# Patient Record
Sex: FEMALE | Race: WHITE | NOT HISPANIC OR LATINO
[De-identification: names, ages, dates, MRNs, and addresses within clinical notes are randomized per-mention and may not be internally consistent; named-entity substitution may affect disease eponyms.]

---

## 2017-09-07 PROBLEM — Z00.00 ENCOUNTER FOR PREVENTIVE HEALTH EXAMINATION: Status: ACTIVE | Noted: 2017-09-07

## 2017-09-08 ENCOUNTER — APPOINTMENT (OUTPATIENT)
Dept: GYNECOLOGIC ONCOLOGY | Facility: CLINIC | Age: 67
End: 2017-09-08
Payer: MEDICARE

## 2017-09-08 VITALS
SYSTOLIC BLOOD PRESSURE: 140 MMHG | DIASTOLIC BLOOD PRESSURE: 80 MMHG | BODY MASS INDEX: 51.91 KG/M2 | WEIGHT: 293 LBS | HEIGHT: 63 IN

## 2017-09-08 DIAGNOSIS — K58.9 IRRITABLE BOWEL SYNDROME W/OUT DIARRHEA: ICD-10-CM

## 2017-09-08 DIAGNOSIS — E66.9 OBESITY, UNSPECIFIED: ICD-10-CM

## 2017-09-08 DIAGNOSIS — Z78.9 OTHER SPECIFIED HEALTH STATUS: ICD-10-CM

## 2017-09-08 DIAGNOSIS — N92.6 IRREGULAR MENSTRUATION, UNSPECIFIED: ICD-10-CM

## 2017-09-08 PROCEDURE — 99205 OFFICE O/P NEW HI 60 MIN: CPT

## 2017-09-08 RX ORDER — DICYCLOMINE HYDROCHLORIDE 10 MG/1
CAPSULE ORAL
Refills: 0 | Status: ACTIVE | COMMUNITY

## 2017-09-12 LAB — CORE LAB BIOPSY: NORMAL

## 2017-09-26 ENCOUNTER — OTHER (OUTPATIENT)
Age: 67
End: 2017-09-26

## 2017-10-11 ENCOUNTER — OUTPATIENT (OUTPATIENT)
Dept: OUTPATIENT SERVICES | Facility: HOSPITAL | Age: 67
LOS: 1 days | End: 2017-10-11
Payer: COMMERCIAL

## 2017-10-11 VITALS
HEART RATE: 66 BPM | TEMPERATURE: 97 F | SYSTOLIC BLOOD PRESSURE: 145 MMHG | HEIGHT: 63 IN | OXYGEN SATURATION: 98 % | DIASTOLIC BLOOD PRESSURE: 88 MMHG | RESPIRATION RATE: 16 BRPM | WEIGHT: 293 LBS

## 2017-10-11 DIAGNOSIS — E66.9 OBESITY, UNSPECIFIED: ICD-10-CM

## 2017-10-11 DIAGNOSIS — N95.9 UNSPECIFIED MENOPAUSAL AND PERIMENOPAUSAL DISORDER: ICD-10-CM

## 2017-10-11 DIAGNOSIS — E78.5 HYPERLIPIDEMIA, UNSPECIFIED: ICD-10-CM

## 2017-10-11 DIAGNOSIS — N95.0 POSTMENOPAUSAL BLEEDING: ICD-10-CM

## 2017-10-11 DIAGNOSIS — K58.9 IRRITABLE BOWEL SYNDROME WITHOUT DIARRHEA: ICD-10-CM

## 2017-10-11 DIAGNOSIS — R93.8 ABNORMAL FINDINGS ON DIAGNOSTIC IMAGING OF OTHER SPECIFIED BODY STRUCTURES: ICD-10-CM

## 2017-10-11 DIAGNOSIS — Z96.659 PRESENCE OF UNSPECIFIED ARTIFICIAL KNEE JOINT: Chronic | ICD-10-CM

## 2017-10-11 DIAGNOSIS — Z01.818 ENCOUNTER FOR OTHER PREPROCEDURAL EXAMINATION: ICD-10-CM

## 2017-10-11 DIAGNOSIS — Z90.49 ACQUIRED ABSENCE OF OTHER SPECIFIED PARTS OF DIGESTIVE TRACT: Chronic | ICD-10-CM

## 2017-10-11 DIAGNOSIS — N83.209 UNSPECIFIED OVARIAN CYST, UNSPECIFIED SIDE: Chronic | ICD-10-CM

## 2017-10-11 DIAGNOSIS — Z84.89 FAMILY HISTORY OF OTHER SPECIFIED CONDITIONS: Chronic | ICD-10-CM

## 2017-10-11 DIAGNOSIS — Z98.891 HISTORY OF UTERINE SCAR FROM PREVIOUS SURGERY: Chronic | ICD-10-CM

## 2017-10-11 LAB
ANION GAP SERPL CALC-SCNC: 14 MMOL/L — SIGNIFICANT CHANGE UP (ref 5–17)
BUN SERPL-MCNC: 21 MG/DL — SIGNIFICANT CHANGE UP (ref 7–23)
CALCIUM SERPL-MCNC: 9.3 MG/DL — SIGNIFICANT CHANGE UP (ref 8.4–10.5)
CHLORIDE SERPL-SCNC: 101 MMOL/L — SIGNIFICANT CHANGE UP (ref 96–108)
CO2 SERPL-SCNC: 25 MMOL/L — SIGNIFICANT CHANGE UP (ref 22–31)
CREAT SERPL-MCNC: 0.98 MG/DL — SIGNIFICANT CHANGE UP (ref 0.5–1.3)
GLUCOSE SERPL-MCNC: 90 MG/DL — SIGNIFICANT CHANGE UP (ref 70–99)
HCT VFR BLD CALC: 43.5 % — SIGNIFICANT CHANGE UP (ref 34.5–45)
HGB BLD-MCNC: 14 G/DL — SIGNIFICANT CHANGE UP (ref 11.5–15.5)
MCHC RBC-ENTMCNC: 28.2 PG — SIGNIFICANT CHANGE UP (ref 27–34)
MCHC RBC-ENTMCNC: 32.2 GM/DL — SIGNIFICANT CHANGE UP (ref 32–36)
MCV RBC AUTO: 87.5 FL — SIGNIFICANT CHANGE UP (ref 80–100)
PLATELET # BLD AUTO: 220 K/UL — SIGNIFICANT CHANGE UP (ref 150–400)
POTASSIUM SERPL-MCNC: 3.9 MMOL/L — SIGNIFICANT CHANGE UP (ref 3.5–5.3)
POTASSIUM SERPL-SCNC: 3.9 MMOL/L — SIGNIFICANT CHANGE UP (ref 3.5–5.3)
RBC # BLD: 4.97 M/UL — SIGNIFICANT CHANGE UP (ref 3.8–5.2)
RBC # FLD: 14.4 % — SIGNIFICANT CHANGE UP (ref 10.3–14.5)
SODIUM SERPL-SCNC: 140 MMOL/L — SIGNIFICANT CHANGE UP (ref 135–145)
WBC # BLD: 6.25 K/UL — SIGNIFICANT CHANGE UP (ref 3.8–10.5)
WBC # FLD AUTO: 6.25 K/UL — SIGNIFICANT CHANGE UP (ref 3.8–10.5)

## 2017-10-11 PROCEDURE — 85027 COMPLETE CBC AUTOMATED: CPT

## 2017-10-11 PROCEDURE — G0463: CPT

## 2017-10-11 PROCEDURE — 80048 BASIC METABOLIC PNL TOTAL CA: CPT

## 2017-10-11 RX ORDER — ACETAMINOPHEN 500 MG
975 TABLET ORAL ONCE
Qty: 0 | Refills: 0 | Status: COMPLETED | OUTPATIENT
Start: 2017-10-18 | End: 2017-10-18

## 2017-10-11 RX ORDER — LIDOCAINE HCL 20 MG/ML
0.2 VIAL (ML) INJECTION ONCE
Qty: 0 | Refills: 0 | Status: DISCONTINUED | OUTPATIENT
Start: 2017-10-18 | End: 2017-11-10

## 2017-10-11 RX ORDER — SODIUM CHLORIDE 9 MG/ML
3 INJECTION INTRAMUSCULAR; INTRAVENOUS; SUBCUTANEOUS EVERY 8 HOURS
Qty: 0 | Refills: 0 | Status: DISCONTINUED | OUTPATIENT
Start: 2017-10-18 | End: 2017-11-10

## 2017-10-11 NOTE — H&P PST ADULT - ATTENDING COMMENTS
Seen in [facility] with family. She reports no changes ot her condition; she is unable to move her leg. Discussed with RN and Anesth team. Consent reviewed and all Q/A.

## 2017-10-11 NOTE — H&P PST ADULT - PMH
Arthritis  knee s/p knee replacement right side 2000  IBS (irritable bowel syndrome)  on Dexilant  Obesity  BMI above 50

## 2017-10-11 NOTE — H&P PST ADULT - MUSCULOSKELETAL
details… No joint pain, swelling or deformity; no limitation of movement detailed exam no joint erythema/no joint swelling/right knee/decreased ROM

## 2017-10-11 NOTE — H&P PST ADULT - PSH
Benign ovarian cyst  removal    FHx: total knee replacement  right   H/O  section    S/P cholecystectomy  1981 Benign ovarian cyst  removal    H/O  section    H/O total knee replacement  right   S/P cholecystectomy  1981

## 2017-10-11 NOTE — H&P PST ADULT - NSANTHOSAYNRD_GEN_A_CORE
No. ORTIZ screening performed.  STOP BANG Legend: 0-2 = LOW Risk; 3-4 = INTERMEDIATE Risk; 5-8 = HIGH Risk

## 2017-10-11 NOTE — H&P PST ADULT - HISTORY OF PRESENT ILLNESS
67 y/o female with history of obesity BMI 52 and hyperlipidemia , IBS came in for  PST  for dilation and curettage  and diagnostic hysteroscopy with sono guidance. Patient has hx of postmenopausal bleeding for 3 months . Patient was referred to Dr Estrada , had diagnostic test  done at the office but needed anesthesia for further evaluation and treatment.

## 2017-10-11 NOTE — H&P PST ADULT - PROBLEM SELECTOR PLAN 1
Hx vaginal bleeding   Dilation and curettage , diagnostic hysteroscopy with sono guidance   CBC/BMP drawn sent pending result   PST instruction given  verbalizes understanding

## 2017-10-18 ENCOUNTER — APPOINTMENT (OUTPATIENT)
Dept: GYNECOLOGIC ONCOLOGY | Facility: HOSPITAL | Age: 67
End: 2017-10-18

## 2017-10-18 ENCOUNTER — OUTPATIENT (OUTPATIENT)
Dept: OUTPATIENT SERVICES | Facility: HOSPITAL | Age: 67
LOS: 1 days | End: 2017-10-18
Payer: COMMERCIAL

## 2017-10-18 ENCOUNTER — APPOINTMENT (OUTPATIENT)
Dept: ULTRASOUND IMAGING | Facility: HOSPITAL | Age: 67
End: 2017-10-18

## 2017-10-18 ENCOUNTER — RESULT REVIEW (OUTPATIENT)
Age: 67
End: 2017-10-18

## 2017-10-18 VITALS
RESPIRATION RATE: 16 BRPM | DIASTOLIC BLOOD PRESSURE: 84 MMHG | OXYGEN SATURATION: 100 % | TEMPERATURE: 98 F | HEIGHT: 63 IN | SYSTOLIC BLOOD PRESSURE: 178 MMHG | HEART RATE: 74 BPM | WEIGHT: 293 LBS

## 2017-10-18 VITALS
SYSTOLIC BLOOD PRESSURE: 145 MMHG | DIASTOLIC BLOOD PRESSURE: 76 MMHG | HEART RATE: 72 BPM | RESPIRATION RATE: 16 BRPM | OXYGEN SATURATION: 98 %

## 2017-10-18 DIAGNOSIS — R93.8 ABNORMAL FINDINGS ON DIAGNOSTIC IMAGING OF OTHER SPECIFIED BODY STRUCTURES: ICD-10-CM

## 2017-10-18 DIAGNOSIS — Z98.891 HISTORY OF UTERINE SCAR FROM PREVIOUS SURGERY: Chronic | ICD-10-CM

## 2017-10-18 DIAGNOSIS — N95.0 POSTMENOPAUSAL BLEEDING: ICD-10-CM

## 2017-10-18 DIAGNOSIS — Z90.49 ACQUIRED ABSENCE OF OTHER SPECIFIED PARTS OF DIGESTIVE TRACT: Chronic | ICD-10-CM

## 2017-10-18 DIAGNOSIS — Z96.659 PRESENCE OF UNSPECIFIED ARTIFICIAL KNEE JOINT: Chronic | ICD-10-CM

## 2017-10-18 DIAGNOSIS — Z01.818 ENCOUNTER FOR OTHER PREPROCEDURAL EXAMINATION: ICD-10-CM

## 2017-10-18 DIAGNOSIS — N83.209 UNSPECIFIED OVARIAN CYST, UNSPECIFIED SIDE: Chronic | ICD-10-CM

## 2017-10-18 PROCEDURE — 88305 TISSUE EXAM BY PATHOLOGIST: CPT | Mod: 26

## 2017-10-18 PROCEDURE — 58558 HYSTEROSCOPY BIOPSY: CPT

## 2017-10-18 PROCEDURE — 58120 DILATION AND CURETTAGE: CPT

## 2017-10-18 PROCEDURE — 76998 US GUIDE INTRAOP: CPT

## 2017-10-18 PROCEDURE — 88305 TISSUE EXAM BY PATHOLOGIST: CPT

## 2017-10-18 RX ORDER — SODIUM CHLORIDE 9 MG/ML
1000 INJECTION INTRAMUSCULAR; INTRAVENOUS; SUBCUTANEOUS
Qty: 0 | Refills: 0 | Status: DISCONTINUED | OUTPATIENT
Start: 2017-10-18 | End: 2017-11-10

## 2017-10-18 RX ORDER — CELECOXIB 200 MG/1
200 CAPSULE ORAL ONCE
Qty: 0 | Refills: 0 | Status: COMPLETED | OUTPATIENT
Start: 2017-10-18 | End: 2017-10-18

## 2017-10-18 RX ORDER — ONDANSETRON 8 MG/1
4 TABLET, FILM COATED ORAL ONCE
Qty: 0 | Refills: 0 | Status: DISCONTINUED | OUTPATIENT
Start: 2017-10-18 | End: 2017-11-10

## 2017-10-18 RX ORDER — DEXLANSOPRAZOLE 30 MG/1
1 CAPSULE, DELAYED RELEASE ORAL
Qty: 0 | Refills: 0 | COMMUNITY

## 2017-10-18 RX ORDER — ATORVASTATIN CALCIUM 80 MG/1
1 TABLET, FILM COATED ORAL
Qty: 0 | Refills: 0 | COMMUNITY

## 2017-10-18 RX ORDER — OXYCODONE HYDROCHLORIDE 5 MG/1
10 TABLET ORAL ONCE
Qty: 0 | Refills: 0 | Status: DISCONTINUED | OUTPATIENT
Start: 2017-10-18 | End: 2017-10-18

## 2017-10-18 RX ORDER — CELECOXIB 200 MG/1
200 CAPSULE ORAL ONCE
Qty: 0 | Refills: 0 | Status: DISCONTINUED | OUTPATIENT
Start: 2017-10-18 | End: 2017-11-10

## 2017-10-18 RX ORDER — OXYCODONE HYDROCHLORIDE 5 MG/1
5 TABLET ORAL ONCE
Qty: 0 | Refills: 0 | Status: DISCONTINUED | OUTPATIENT
Start: 2017-10-18 | End: 2017-10-18

## 2017-10-18 RX ADMIN — CELECOXIB 200 MILLIGRAM(S): 200 CAPSULE ORAL at 11:57

## 2017-10-18 RX ADMIN — Medication 975 MILLIGRAM(S): at 11:57

## 2017-10-18 NOTE — BRIEF OPERATIVE NOTE - PROCEDURE
<<-----Click on this checkbox to enter Procedure Curettage and dilatation  10/18/2017    Active  AMENZIN

## 2017-10-18 NOTE — BRIEF OPERATIVE NOTE - POST-OP DX
Cervical stenosis (uterine cervix)  10/18/2017    Active  Magdy Estrada  Postmenopausal bleeding  10/18/2017    Active  Magdy Estrada  Thickened endometrium  10/18/2017    Active  Magdy Estrada

## 2017-10-18 NOTE — PRE-ANESTHESIA EVALUATION ADULT - NSANTHPMHFT_GEN_ALL_CORE
PONV after most of the surgeries  Asthma takes albuterol PRN, last time was about 5 months ago when she took it

## 2017-10-18 NOTE — ASU PATIENT PROFILE, ADULT - PSH
Benign ovarian cyst  removal    H/O  section    H/O total knee replacement  right   S/P cholecystectomy  1981

## 2017-10-18 NOTE — ASU DISCHARGE PLAN (ADULT/PEDIATRIC). - NOTIFY
Persistent Nausea and Vomiting/Excessive Diarrhea/Inability to Tolerate Liquids or Foods/Fever greater than 101/GYN Fever>100.4/Increased Irritability or Sluggishness/Unable to Urinate/Pain not relieved by Medications/Swelling that continues/Numbness, tingling/Numbness, color, or temperature change to extremity/Bleeding that does not stop

## 2017-10-19 ENCOUNTER — TRANSCRIPTION ENCOUNTER (OUTPATIENT)
Age: 67
End: 2017-10-19

## 2017-10-20 LAB — SURGICAL PATHOLOGY STUDY: SIGNIFICANT CHANGE UP

## 2017-11-06 ENCOUNTER — APPOINTMENT (OUTPATIENT)
Dept: GYNECOLOGIC ONCOLOGY | Facility: CLINIC | Age: 67
End: 2017-11-06
Payer: MEDICARE

## 2017-11-06 DIAGNOSIS — Z71.89 OTHER SPECIFIED COUNSELING: ICD-10-CM

## 2017-11-06 DIAGNOSIS — N95.0 POSTMENOPAUSAL BLEEDING: ICD-10-CM

## 2017-11-06 DIAGNOSIS — R93.8 ABNORMAL FINDINGS ON DIAGNOSTIC IMAGING OF OTHER SPECIFIED BODY STRUCTURES: ICD-10-CM

## 2017-11-06 PROCEDURE — 99213 OFFICE O/P EST LOW 20 MIN: CPT

## 2017-11-09 ENCOUNTER — OTHER (OUTPATIENT)
Age: 67
End: 2017-11-09

## 2017-11-10 ENCOUNTER — APPOINTMENT (OUTPATIENT)
Dept: CT IMAGING | Facility: CLINIC | Age: 67
End: 2017-11-10

## 2017-11-18 PROBLEM — R93.8 THICKENED ENDOMETRIUM: Status: ACTIVE | Noted: 2017-09-08

## 2017-11-27 ENCOUNTER — OTHER (OUTPATIENT)
Age: 67
End: 2017-11-27

## 2017-12-07 ENCOUNTER — OTHER (OUTPATIENT)
Age: 67
End: 2017-12-07

## 2018-01-30 ENCOUNTER — OTHER (OUTPATIENT)
Age: 68
End: 2018-01-30

## 2018-01-31 ENCOUNTER — OUTPATIENT (OUTPATIENT)
Dept: OUTPATIENT SERVICES | Facility: HOSPITAL | Age: 68
LOS: 1 days | End: 2018-01-31
Payer: COMMERCIAL

## 2018-01-31 VITALS
WEIGHT: 293 LBS | DIASTOLIC BLOOD PRESSURE: 88 MMHG | HEART RATE: 80 BPM | TEMPERATURE: 97 F | HEIGHT: 63.5 IN | RESPIRATION RATE: 16 BRPM | SYSTOLIC BLOOD PRESSURE: 134 MMHG

## 2018-01-31 DIAGNOSIS — C54.1 MALIGNANT NEOPLASM OF ENDOMETRIUM: ICD-10-CM

## 2018-01-31 DIAGNOSIS — Z90.49 ACQUIRED ABSENCE OF OTHER SPECIFIED PARTS OF DIGESTIVE TRACT: Chronic | ICD-10-CM

## 2018-01-31 DIAGNOSIS — Z98.49 CATARACT EXTRACTION STATUS, UNSPECIFIED EYE: Chronic | ICD-10-CM

## 2018-01-31 DIAGNOSIS — Z29.9 ENCOUNTER FOR PROPHYLACTIC MEASURES, UNSPECIFIED: ICD-10-CM

## 2018-01-31 DIAGNOSIS — N83.209 UNSPECIFIED OVARIAN CYST, UNSPECIFIED SIDE: Chronic | ICD-10-CM

## 2018-01-31 DIAGNOSIS — I10 ESSENTIAL (PRIMARY) HYPERTENSION: ICD-10-CM

## 2018-01-31 DIAGNOSIS — Z98.891 HISTORY OF UTERINE SCAR FROM PREVIOUS SURGERY: Chronic | ICD-10-CM

## 2018-01-31 DIAGNOSIS — J45.909 UNSPECIFIED ASTHMA, UNCOMPLICATED: ICD-10-CM

## 2018-01-31 DIAGNOSIS — Z96.659 PRESENCE OF UNSPECIFIED ARTIFICIAL KNEE JOINT: Chronic | ICD-10-CM

## 2018-01-31 LAB
BLD GP AB SCN SERPL QL: NEGATIVE — SIGNIFICANT CHANGE UP
BUN SERPL-MCNC: 22 MG/DL — SIGNIFICANT CHANGE UP (ref 7–23)
CALCIUM SERPL-MCNC: 9 MG/DL — SIGNIFICANT CHANGE UP (ref 8.4–10.5)
CHLORIDE SERPL-SCNC: 102 MMOL/L — SIGNIFICANT CHANGE UP (ref 98–107)
CO2 SERPL-SCNC: 23 MMOL/L — SIGNIFICANT CHANGE UP (ref 22–31)
CREAT SERPL-MCNC: 0.93 MG/DL — SIGNIFICANT CHANGE UP (ref 0.5–1.3)
GLUCOSE SERPL-MCNC: 73 MG/DL — SIGNIFICANT CHANGE UP (ref 70–99)
HBA1C BLD-MCNC: 6 % — HIGH (ref 4–5.6)
HCT VFR BLD CALC: 46.7 % — HIGH (ref 34.5–45)
HGB BLD-MCNC: 15.2 G/DL — SIGNIFICANT CHANGE UP (ref 11.5–15.5)
MCHC RBC-ENTMCNC: 28.5 PG — SIGNIFICANT CHANGE UP (ref 27–34)
MCHC RBC-ENTMCNC: 32.5 % — SIGNIFICANT CHANGE UP (ref 32–36)
MCV RBC AUTO: 87.6 FL — SIGNIFICANT CHANGE UP (ref 80–100)
NRBC # FLD: 0 — SIGNIFICANT CHANGE UP
PLATELET # BLD AUTO: 242 K/UL — SIGNIFICANT CHANGE UP (ref 150–400)
PMV BLD: SIGNIFICANT CHANGE UP FL (ref 7–13)
POTASSIUM SERPL-MCNC: 4.3 MMOL/L — SIGNIFICANT CHANGE UP (ref 3.5–5.3)
POTASSIUM SERPL-SCNC: 4.3 MMOL/L — SIGNIFICANT CHANGE UP (ref 3.5–5.3)
RBC # BLD: 5.33 M/UL — HIGH (ref 3.8–5.2)
RBC # FLD: SIGNIFICANT CHANGE UP % (ref 10.3–14.5)
RH IG SCN BLD-IMP: POSITIVE — SIGNIFICANT CHANGE UP
SODIUM SERPL-SCNC: 142 MMOL/L — SIGNIFICANT CHANGE UP (ref 135–145)
WBC # BLD: 8.42 K/UL — SIGNIFICANT CHANGE UP (ref 3.8–10.5)
WBC # FLD AUTO: 8.42 K/UL — SIGNIFICANT CHANGE UP (ref 3.8–10.5)

## 2018-01-31 PROCEDURE — 93010 ELECTROCARDIOGRAM REPORT: CPT

## 2018-01-31 NOTE — H&P PST ADULT - ASSESSMENT
66 y/o female scheduled for robotic laparoscopic hysterectomy, bilateral salpingo oophorectomy, sentinel lymph node mapping

## 2018-01-31 NOTE — H&P PST ADULT - FAMILY HISTORY
Sibling  Still living? No  Family history of bone cancer, Age at diagnosis: Age Unknown     Father  Still living? No  Family history of cancer, Age at diagnosis: Age Unknown

## 2018-01-31 NOTE — H&P PST ADULT - GENITOURINARY COMMENTS
reports history of intermittent spotting, bleeding became heavy in June 2017, seen at [facility]. now bleeding resolved

## 2018-01-31 NOTE — H&P PST ADULT - PROBLEM SELECTOR PLAN 1
Pt. is scheduled for robotic laparoscopic hysterectomy, bilateral salpingo oophorectomy, sentinel lymph node mapping on 2/2/18.  Preoperative instructions reviewed, pt verbalized understanding.    Preop Famotidine and Chlorhexidine provided.  Lab results pending, EKG done.  Pt. instructed to obtain a cardiac evaluation prior to surgery by surgeon

## 2018-01-31 NOTE — H&P PST ADULT - PMH
Arthritis  knee s/p knee replacement right side 2000  Asthma  denies any recent hospitalizations (rarely using inhaler), denies intubations  Endometrial adenocarcinoma    IBS (irritable bowel syndrome)  on Dexilant  Obesity  BMI above 50 Arthritis  knee s/p knee replacement right side 2000  Asthma  denies any recent hospitalizations (rarely using inhaler), denies intubations  Endometrial adenocarcinoma    Hypertension    IBS (irritable bowel syndrome)  on Dexilant  Obesity  BMI above 50

## 2018-01-31 NOTE — H&P PST ADULT - ATTENDING COMMENTS
Seen in [facility]. Planned procedure discussed, including possible conversion to an open procedure. Periop issues discussed, All Q/A. Consent reviewed.

## 2018-01-31 NOTE — H&P PST ADULT - PSH
Benign ovarian cyst  removal    H/O  section    H/O total knee replacement  right 2000  History of cataract surgery  right "years ago"  S/P cholecystectomy  1981

## 2018-01-31 NOTE — H&P PST ADULT - HISTORY OF PRESENT ILLNESS
66 y/o female recently diagnosed with endometrial cancer presents to PAST today for presurgical evaluation.  She reports history of abnormal vaginal bleeding for several months and saw her gynecologist.  Biopsy confirmed malignancy.  She denies any other pelvic pain, cramping, bloating or change in bowel habits.  She is scheduled for robotic laparoscopic hysterectomy, bilateral salpingo oophorectomy, sentinel lymph node mapping on 2/2/18.

## 2018-02-01 NOTE — ASU PATIENT PROFILE, ADULT - PMH
Arthritis  knee s/p knee replacement right side 2000  Asthma  denies any recent hospitalizations (rarely using inhaler), denies intubations  Endometrial adenocarcinoma    Hypertension    IBS (irritable bowel syndrome)  on Dexilant  Obesity  BMI above 50

## 2018-02-02 ENCOUNTER — RESULT REVIEW (OUTPATIENT)
Age: 68
End: 2018-02-02

## 2018-02-02 ENCOUNTER — TRANSCRIPTION ENCOUNTER (OUTPATIENT)
Age: 68
End: 2018-02-02

## 2018-02-02 ENCOUNTER — APPOINTMENT (OUTPATIENT)
Dept: GYNECOLOGIC ONCOLOGY | Facility: HOSPITAL | Age: 68
End: 2018-02-02
Payer: MEDICARE

## 2018-02-02 ENCOUNTER — INPATIENT (INPATIENT)
Facility: HOSPITAL | Age: 68
LOS: 0 days | Discharge: HOME | End: 2018-02-03
Attending: OBSTETRICS & GYNECOLOGY | Admitting: OBSTETRICS & GYNECOLOGY
Payer: COMMERCIAL

## 2018-02-02 ENCOUNTER — APPOINTMENT (OUTPATIENT)
Dept: GYNECOLOGIC ONCOLOGY | Facility: HOSPITAL | Age: 68
End: 2018-02-02

## 2018-02-02 VITALS
HEART RATE: 88 BPM | TEMPERATURE: 98 F | WEIGHT: 293 LBS | SYSTOLIC BLOOD PRESSURE: 156 MMHG | RESPIRATION RATE: 16 BRPM | DIASTOLIC BLOOD PRESSURE: 85 MMHG | HEIGHT: 63.5 IN

## 2018-02-02 DIAGNOSIS — Z96.659 PRESENCE OF UNSPECIFIED ARTIFICIAL KNEE JOINT: Chronic | ICD-10-CM

## 2018-02-02 DIAGNOSIS — N83.209 UNSPECIFIED OVARIAN CYST, UNSPECIFIED SIDE: Chronic | ICD-10-CM

## 2018-02-02 DIAGNOSIS — Z98.891 HISTORY OF UTERINE SCAR FROM PREVIOUS SURGERY: Chronic | ICD-10-CM

## 2018-02-02 DIAGNOSIS — C54.1 MALIGNANT NEOPLASM OF ENDOMETRIUM: ICD-10-CM

## 2018-02-02 DIAGNOSIS — Z90.49 ACQUIRED ABSENCE OF OTHER SPECIFIED PARTS OF DIGESTIVE TRACT: Chronic | ICD-10-CM

## 2018-02-02 DIAGNOSIS — Z98.49 CATARACT EXTRACTION STATUS, UNSPECIFIED EYE: Chronic | ICD-10-CM

## 2018-02-02 LAB
BASOPHILS # BLD AUTO: 0.04 K/UL — SIGNIFICANT CHANGE UP (ref 0–0.2)
BASOPHILS NFR BLD AUTO: 0.3 % — SIGNIFICANT CHANGE UP (ref 0–2)
BUN SERPL-MCNC: 15 MG/DL — SIGNIFICANT CHANGE UP (ref 7–23)
CALCIUM SERPL-MCNC: 8.8 MG/DL — SIGNIFICANT CHANGE UP (ref 8.4–10.5)
CHLORIDE SERPL-SCNC: 97 MMOL/L — LOW (ref 98–107)
CO2 SERPL-SCNC: 28 MMOL/L — SIGNIFICANT CHANGE UP (ref 22–31)
CREAT SERPL-MCNC: 0.96 MG/DL — SIGNIFICANT CHANGE UP (ref 0.5–1.3)
EOSINOPHIL # BLD AUTO: 0.01 K/UL — SIGNIFICANT CHANGE UP (ref 0–0.5)
EOSINOPHIL NFR BLD AUTO: 0.1 % — SIGNIFICANT CHANGE UP (ref 0–6)
GLUCOSE BLDC GLUCOMTR-MCNC: 99 MG/DL — SIGNIFICANT CHANGE UP (ref 70–99)
GLUCOSE SERPL-MCNC: 163 MG/DL — HIGH (ref 70–99)
HCT VFR BLD CALC: 45.8 % — HIGH (ref 34.5–45)
HGB BLD-MCNC: 15.1 G/DL — SIGNIFICANT CHANGE UP (ref 11.5–15.5)
IMM GRANULOCYTES # BLD AUTO: 0.08 # — SIGNIFICANT CHANGE UP
IMM GRANULOCYTES NFR BLD AUTO: 0.7 % — SIGNIFICANT CHANGE UP (ref 0–1.5)
LYMPHOCYTES # BLD AUTO: 1.17 K/UL — SIGNIFICANT CHANGE UP (ref 1–3.3)
LYMPHOCYTES # BLD AUTO: 9.8 % — LOW (ref 13–44)
MAGNESIUM SERPL-MCNC: 1.9 MG/DL — SIGNIFICANT CHANGE UP (ref 1.6–2.6)
MCHC RBC-ENTMCNC: 29.3 PG — SIGNIFICANT CHANGE UP (ref 27–34)
MCHC RBC-ENTMCNC: 33 % — SIGNIFICANT CHANGE UP (ref 32–36)
MCV RBC AUTO: 88.8 FL — SIGNIFICANT CHANGE UP (ref 80–100)
MONOCYTES # BLD AUTO: 0.14 K/UL — SIGNIFICANT CHANGE UP (ref 0–0.9)
MONOCYTES NFR BLD AUTO: 1.2 % — LOW (ref 2–14)
NEUTROPHILS # BLD AUTO: 10.5 K/UL — HIGH (ref 1.8–7.4)
NEUTROPHILS NFR BLD AUTO: 87.9 % — HIGH (ref 43–77)
NRBC # FLD: 0 — SIGNIFICANT CHANGE UP
PHOSPHATE SERPL-MCNC: 3.7 MG/DL — SIGNIFICANT CHANGE UP (ref 2.5–4.5)
PLATELET # BLD AUTO: 218 K/UL — SIGNIFICANT CHANGE UP (ref 150–400)
PMV BLD: 10.4 FL — SIGNIFICANT CHANGE UP (ref 7–13)
POTASSIUM SERPL-MCNC: 4.2 MMOL/L — SIGNIFICANT CHANGE UP (ref 3.5–5.3)
POTASSIUM SERPL-SCNC: 4.2 MMOL/L — SIGNIFICANT CHANGE UP (ref 3.5–5.3)
RBC # BLD: 5.16 M/UL — SIGNIFICANT CHANGE UP (ref 3.8–5.2)
RBC # FLD: 13.3 % — SIGNIFICANT CHANGE UP (ref 10.3–14.5)
RH IG SCN BLD-IMP: POSITIVE — SIGNIFICANT CHANGE UP
SODIUM SERPL-SCNC: 138 MMOL/L — SIGNIFICANT CHANGE UP (ref 135–145)
WBC # BLD: 11.94 K/UL — HIGH (ref 3.8–10.5)
WBC # FLD AUTO: 11.94 K/UL — HIGH (ref 3.8–10.5)

## 2018-02-02 PROCEDURE — 88342 IMHCHEM/IMCYTCHM 1ST ANTB: CPT | Mod: 26

## 2018-02-02 PROCEDURE — 88305 TISSUE EXAM BY PATHOLOGIST: CPT | Mod: 26

## 2018-02-02 PROCEDURE — 88309 TISSUE EXAM BY PATHOLOGIST: CPT | Mod: 26

## 2018-02-02 PROCEDURE — 38570 LAPAROSCOPY LYMPH NODE BIOP: CPT

## 2018-02-02 PROCEDURE — 58571 TLH W/T/O 250 G OR LESS: CPT

## 2018-02-02 PROCEDURE — 88341 IMHCHEM/IMCYTCHM EA ADD ANTB: CPT | Mod: 26

## 2018-02-02 PROCEDURE — 57800 DILATION OF CERVICAL CANAL: CPT | Mod: 59

## 2018-02-02 PROCEDURE — 88307 TISSUE EXAM BY PATHOLOGIST: CPT | Mod: 26

## 2018-02-02 RX ORDER — SODIUM CHLORIDE 9 MG/ML
1000 INJECTION, SOLUTION INTRAVENOUS
Qty: 0 | Refills: 0 | Status: DISCONTINUED | OUTPATIENT
Start: 2018-02-02 | End: 2018-02-03

## 2018-02-02 RX ORDER — FENTANYL CITRATE 50 UG/ML
25 INJECTION INTRAVENOUS
Qty: 0 | Refills: 0 | Status: DISCONTINUED | OUTPATIENT
Start: 2018-02-02 | End: 2018-02-03

## 2018-02-02 RX ORDER — HEPARIN SODIUM 5000 [USP'U]/ML
5000 INJECTION INTRAVENOUS; SUBCUTANEOUS ONCE
Qty: 0 | Refills: 0 | Status: COMPLETED | OUTPATIENT
Start: 2018-02-02 | End: 2018-02-02

## 2018-02-02 RX ORDER — OXYCODONE HYDROCHLORIDE 5 MG/1
1 TABLET ORAL
Qty: 15 | Refills: 0 | OUTPATIENT
Start: 2018-02-02

## 2018-02-02 RX ORDER — OXYCODONE HYDROCHLORIDE 5 MG/1
10 TABLET ORAL EVERY 6 HOURS
Qty: 0 | Refills: 0 | Status: DISCONTINUED | OUTPATIENT
Start: 2018-02-02 | End: 2018-02-03

## 2018-02-02 RX ORDER — ACETAMINOPHEN 500 MG
975 TABLET ORAL EVERY 6 HOURS
Qty: 0 | Refills: 0 | Status: DISCONTINUED | OUTPATIENT
Start: 2018-02-02 | End: 2018-02-03

## 2018-02-02 RX ORDER — OXYCODONE HYDROCHLORIDE 5 MG/1
5 TABLET ORAL EVERY 4 HOURS
Qty: 0 | Refills: 0 | Status: DISCONTINUED | OUTPATIENT
Start: 2018-02-02 | End: 2018-02-03

## 2018-02-02 RX ORDER — ACETAMINOPHEN 500 MG
975 TABLET ORAL EVERY 6 HOURS
Qty: 0 | Refills: 0 | Status: DISCONTINUED | OUTPATIENT
Start: 2018-02-02 | End: 2018-02-02

## 2018-02-02 RX ORDER — SODIUM CHLORIDE 9 MG/ML
1000 INJECTION, SOLUTION INTRAVENOUS
Qty: 0 | Refills: 0 | Status: DISCONTINUED | OUTPATIENT
Start: 2018-02-02 | End: 2018-02-02

## 2018-02-02 RX ORDER — OXYCODONE HYDROCHLORIDE 5 MG/1
5 TABLET ORAL EVERY 4 HOURS
Qty: 0 | Refills: 0 | Status: DISCONTINUED | OUTPATIENT
Start: 2018-02-02 | End: 2018-02-02

## 2018-02-02 RX ORDER — ONDANSETRON 8 MG/1
4 TABLET, FILM COATED ORAL ONCE
Qty: 0 | Refills: 0 | Status: DISCONTINUED | OUTPATIENT
Start: 2018-02-02 | End: 2018-02-03

## 2018-02-02 RX ORDER — IBUPROFEN 200 MG
600 TABLET ORAL EVERY 6 HOURS
Qty: 0 | Refills: 0 | Status: DISCONTINUED | OUTPATIENT
Start: 2018-02-02 | End: 2018-02-02

## 2018-02-02 RX ORDER — HEPARIN SODIUM 5000 [USP'U]/ML
5000 INJECTION INTRAVENOUS; SUBCUTANEOUS EVERY 8 HOURS
Qty: 0 | Refills: 0 | Status: DISCONTINUED | OUTPATIENT
Start: 2018-02-02 | End: 2018-02-03

## 2018-02-02 RX ORDER — IBUPROFEN 200 MG
600 TABLET ORAL EVERY 6 HOURS
Qty: 0 | Refills: 0 | Status: DISCONTINUED | OUTPATIENT
Start: 2018-02-02 | End: 2018-02-03

## 2018-02-02 RX ORDER — HEPARIN SODIUM 5000 [USP'U]/ML
5000 INJECTION INTRAVENOUS; SUBCUTANEOUS EVERY 8 HOURS
Qty: 0 | Refills: 0 | Status: DISCONTINUED | OUTPATIENT
Start: 2018-02-02 | End: 2018-02-02

## 2018-02-02 RX ORDER — ACETAMINOPHEN 500 MG
3 TABLET ORAL
Qty: 0 | Refills: 0 | COMMUNITY
Start: 2018-02-02

## 2018-02-02 RX ADMIN — SODIUM CHLORIDE 170 MILLILITER(S): 9 INJECTION, SOLUTION INTRAVENOUS at 22:15

## 2018-02-02 RX ADMIN — SODIUM CHLORIDE 30 MILLILITER(S): 9 INJECTION, SOLUTION INTRAVENOUS at 10:14

## 2018-02-02 RX ADMIN — HEPARIN SODIUM 5000 UNIT(S): 5000 INJECTION INTRAVENOUS; SUBCUTANEOUS at 22:56

## 2018-02-02 RX ADMIN — FENTANYL CITRATE 25 MICROGRAM(S): 50 INJECTION INTRAVENOUS at 23:15

## 2018-02-02 RX ADMIN — HEPARIN SODIUM 5000 UNIT(S): 5000 INJECTION INTRAVENOUS; SUBCUTANEOUS at 10:13

## 2018-02-02 RX ADMIN — FENTANYL CITRATE 25 MICROGRAM(S): 50 INJECTION INTRAVENOUS at 23:30

## 2018-02-02 NOTE — DISCHARGE NOTE ADULT - MEDICATION SUMMARY - MEDICATIONS TO TAKE
I will START or STAY ON the medications listed below when I get home from the hospital:    acetaminophen 325 mg oral tablet  -- 3 tab(s) by mouth every 6 hours, As needed, Mild Pain (1 - 3)  -- Indication: For Pain    oxyCODONE 5 mg oral tablet  -- 1 tab(s) by mouth every 4 to 6 hours, As Needed -Severe Pain (7 - 10) MDD:6 tabs  -- Indication: For Pain    Aleve PM 25 mg-220 mg oral tablet  -- 2 tab(s) by mouth once (at bedtime). Last dose 1/30/18  -- Indication: For Pain    ibuprofen 200 mg oral tablet  -- 2 tab(s) by mouth , As Needed. Last dose 1/31/18  -- Indication: For Pain, do not take with aleve    aspirin 81 mg oral tablet  -- 1 tab(s) by mouth once a day (at bedtime). Last dose 1/30/18  -- Indication: For Home med    losartan 50 mg oral tablet  -- 1 tab(s) by mouth once a day (at bedtime)  -- Indication: For Home med    atorvastatin 10 mg oral tablet  -- 1 tab(s) by mouth once a day (at bedtime)  -- Indication: For Home med    albuterol 90 mcg/inh inhalation aerosol  -- 2 puff(s) inhaled 4 times a day, As Needed  -- Indication: For Home med    dicyclomine 20 mg oral tablet  -- 1 tab(s) by mouth 4 times a day, As Needed  -- Indication: For Home med

## 2018-02-02 NOTE — ASU PREOP CHECKLIST - 1.
colorectal/ Abdominal Hysterectomy Surgery Process Checklist and SCIP measures initiated form in chart.

## 2018-02-02 NOTE — DISCHARGE NOTE ADULT - HOSPITAL COURSE
68 y/o s/p RA TLH-BSO, SLND, cystoscopy.  Please see operative note for details.  The patient was transferred to the floor post-op in stable condition with po pain control, a oliva in place, and with a clear liquid diet. POD#1 pain was well controlled, oliva was removed, the patient voided, and was advanced to regular diet. Hct: _____->____On the day of discharge the patient is afebrile and hemodynamically stable. She is ambulating without assistance, voiding spontaneously, and tolerating regular diet. Her pain is well controlled on oral medication. She is cleared for discharge with instructions for appropriate follow up. 68 y/o s/p RA TLH-BSO, SLND, cystoscopy.  Please see operative note for details.  The patient was transferred to the floor post-op in stable condition with po pain control, a oliva in place, and with a clear liquid diet. POD#1 pain was well controlled, oliva was removed, the patient voided, and was advanced to regular diet. On the day of discharge the patient is afebrile and hemodynamically stable. She is ambulating without assistance, voiding spontaneously, and tolerating regular diet. Her pain is well controlled on oral medication. She is cleared for discharge with instructions for appropriate follow up.

## 2018-02-02 NOTE — DISCHARGE NOTE ADULT - CONDITIONS AT DISCHARGE
Pt ambulates and voids without difficulty. Pt's VSS. Pt is A+OX4. Pt tolerates diet. Pt's VSS. Pt has abd lap sites X4. Lap sites are without redness, swelling or bleeding.

## 2018-02-02 NOTE — DISCHARGE NOTE ADULT - PLAN OF CARE
wellness Regular diet as tolerated, regular activity as tolerated, no heavy lifting for first two weeks.  Nothing per vagina: no intercourse, tampons or douching.  Call your provider if you experience fevers, chills, worsening abdominal pain, inability to urinate or worsening vaginal bleeding.  Follow up with your provider in 2 weeks.

## 2018-02-02 NOTE — DISCHARGE NOTE ADULT - CARE PROVIDER_API CALL
Magdy Estrada), Gynecologic Oncology; Obstetrics and Gynecology  [address] [place] [address]  [address] [place] [address]  Phone: [phone]  Fax: [phone]

## 2018-02-02 NOTE — DISCHARGE NOTE ADULT - CARE PLAN
Principal Discharge DX:	Endometrial adenocarcinoma  Goal:	wellness  Assessment and plan of treatment:	Regular diet as tolerated, regular activity as tolerated, no heavy lifting for first two weeks.  Nothing per vagina: no intercourse, tampons or douching.  Call your provider if you experience fevers, chills, worsening abdominal pain, inability to urinate or worsening vaginal bleeding.  Follow up with your provider in 2 weeks.

## 2018-02-03 ENCOUNTER — TRANSCRIPTION ENCOUNTER (OUTPATIENT)
Age: 68
End: 2018-02-03

## 2018-02-03 VITALS
DIASTOLIC BLOOD PRESSURE: 96 MMHG | HEART RATE: 84 BPM | OXYGEN SATURATION: 94 % | RESPIRATION RATE: 18 BRPM | TEMPERATURE: 98 F | SYSTOLIC BLOOD PRESSURE: 141 MMHG

## 2018-02-03 LAB
BASOPHILS # BLD AUTO: 0 K/UL — SIGNIFICANT CHANGE UP (ref 0–0.2)
BASOPHILS NFR BLD AUTO: 0 % — SIGNIFICANT CHANGE UP (ref 0–2)
BUN SERPL-MCNC: 13 MG/DL — SIGNIFICANT CHANGE UP (ref 7–23)
CALCIUM SERPL-MCNC: 8.5 MG/DL — SIGNIFICANT CHANGE UP (ref 8.4–10.5)
CHLORIDE SERPL-SCNC: 99 MMOL/L — SIGNIFICANT CHANGE UP (ref 98–107)
CO2 SERPL-SCNC: 25 MMOL/L — SIGNIFICANT CHANGE UP (ref 22–31)
CREAT SERPL-MCNC: 0.9 MG/DL — SIGNIFICANT CHANGE UP (ref 0.5–1.3)
EOSINOPHIL # BLD AUTO: 0 K/UL — SIGNIFICANT CHANGE UP (ref 0–0.5)
EOSINOPHIL NFR BLD AUTO: 0 % — SIGNIFICANT CHANGE UP (ref 0–6)
GLUCOSE SERPL-MCNC: 140 MG/DL — HIGH (ref 70–99)
HCT VFR BLD CALC: 43.2 % — SIGNIFICANT CHANGE UP (ref 34.5–45)
HGB BLD-MCNC: 13.8 G/DL — SIGNIFICANT CHANGE UP (ref 11.5–15.5)
IMM GRANULOCYTES # BLD AUTO: 0.04 # — SIGNIFICANT CHANGE UP
IMM GRANULOCYTES NFR BLD AUTO: 0.4 % — SIGNIFICANT CHANGE UP (ref 0–1.5)
LYMPHOCYTES # BLD AUTO: 0.98 K/UL — LOW (ref 1–3.3)
LYMPHOCYTES # BLD AUTO: 9.4 % — LOW (ref 13–44)
MAGNESIUM SERPL-MCNC: 1.9 MG/DL — SIGNIFICANT CHANGE UP (ref 1.6–2.6)
MCHC RBC-ENTMCNC: 27.7 PG — SIGNIFICANT CHANGE UP (ref 27–34)
MCHC RBC-ENTMCNC: 31.9 % — LOW (ref 32–36)
MCV RBC AUTO: 86.6 FL — SIGNIFICANT CHANGE UP (ref 80–100)
MONOCYTES # BLD AUTO: 0.25 K/UL — SIGNIFICANT CHANGE UP (ref 0–0.9)
MONOCYTES NFR BLD AUTO: 2.4 % — SIGNIFICANT CHANGE UP (ref 2–14)
NEUTROPHILS # BLD AUTO: 9.13 K/UL — HIGH (ref 1.8–7.4)
NEUTROPHILS NFR BLD AUTO: 87.8 % — HIGH (ref 43–77)
NRBC # FLD: 0 — SIGNIFICANT CHANGE UP
PHOSPHATE SERPL-MCNC: 3.5 MG/DL — SIGNIFICANT CHANGE UP (ref 2.5–4.5)
PLATELET # BLD AUTO: 237 K/UL — SIGNIFICANT CHANGE UP (ref 150–400)
PMV BLD: 10.7 FL — SIGNIFICANT CHANGE UP (ref 7–13)
POTASSIUM SERPL-MCNC: 4.7 MMOL/L — SIGNIFICANT CHANGE UP (ref 3.5–5.3)
POTASSIUM SERPL-SCNC: 4.7 MMOL/L — SIGNIFICANT CHANGE UP (ref 3.5–5.3)
RBC # BLD: 4.99 M/UL — SIGNIFICANT CHANGE UP (ref 3.8–5.2)
RBC # FLD: 13.3 % — SIGNIFICANT CHANGE UP (ref 10.3–14.5)
SODIUM SERPL-SCNC: 137 MMOL/L — SIGNIFICANT CHANGE UP (ref 135–145)
WBC # BLD: 10.4 K/UL — SIGNIFICANT CHANGE UP (ref 3.8–10.5)
WBC # FLD AUTO: 10.4 K/UL — SIGNIFICANT CHANGE UP (ref 3.8–10.5)

## 2018-02-03 PROCEDURE — 88305 TISSUE EXAM BY PATHOLOGIST: CPT | Mod: 26

## 2018-02-03 PROCEDURE — 88112 CYTOPATH CELL ENHANCE TECH: CPT | Mod: 26

## 2018-02-03 RX ORDER — BENZOCAINE AND MENTHOL 5; 1 G/100ML; G/100ML
1 LIQUID ORAL EVERY 4 HOURS
Qty: 0 | Refills: 0 | Status: DISCONTINUED | OUTPATIENT
Start: 2018-02-03 | End: 2018-02-03

## 2018-02-03 RX ORDER — ASPIRIN/CALCIUM CARB/MAGNESIUM 324 MG
1 TABLET ORAL
Qty: 0 | Refills: 0 | COMMUNITY

## 2018-02-03 RX ORDER — BENZOCAINE AND MENTHOL 5; 1 G/100ML; G/100ML
1 LIQUID ORAL DAILY
Qty: 0 | Refills: 0 | Status: DISCONTINUED | OUTPATIENT
Start: 2018-02-03 | End: 2018-02-03

## 2018-02-03 RX ORDER — ATORVASTATIN CALCIUM 80 MG/1
1 TABLET, FILM COATED ORAL
Qty: 0 | Refills: 0 | COMMUNITY

## 2018-02-03 RX ORDER — ALBUTEROL 90 UG/1
2 AEROSOL, METERED ORAL
Qty: 0 | Refills: 0 | COMMUNITY

## 2018-02-03 RX ORDER — IBUPROFEN 200 MG
2 TABLET ORAL
Qty: 0 | Refills: 0 | COMMUNITY

## 2018-02-03 RX ORDER — OXYCODONE HYDROCHLORIDE 5 MG/1
1 TABLET ORAL
Qty: 30 | Refills: 0 | OUTPATIENT
Start: 2018-02-03 | End: 2018-02-07

## 2018-02-03 RX ORDER — DIPHENHYDRAMINE HYDROCHLORIDE AND NAPROXEN SODIUM 25; 220 MG/1; MG/1
2 TABLET, FILM COATED ORAL
Qty: 0 | Refills: 0 | COMMUNITY

## 2018-02-03 RX ORDER — LOSARTAN POTASSIUM 100 MG/1
1 TABLET, FILM COATED ORAL
Qty: 0 | Refills: 0 | COMMUNITY

## 2018-02-03 RX ADMIN — OXYCODONE HYDROCHLORIDE 10 MILLIGRAM(S): 5 TABLET ORAL at 08:18

## 2018-02-03 RX ADMIN — OXYCODONE HYDROCHLORIDE 10 MILLIGRAM(S): 5 TABLET ORAL at 09:00

## 2018-02-03 RX ADMIN — Medication 600 MILLIGRAM(S): at 05:00

## 2018-02-03 RX ADMIN — HEPARIN SODIUM 5000 UNIT(S): 5000 INJECTION INTRAVENOUS; SUBCUTANEOUS at 05:32

## 2018-02-03 RX ADMIN — OXYCODONE HYDROCHLORIDE 10 MILLIGRAM(S): 5 TABLET ORAL at 02:50

## 2018-02-03 RX ADMIN — Medication 600 MILLIGRAM(S): at 13:40

## 2018-02-03 RX ADMIN — SODIUM CHLORIDE 170 MILLILITER(S): 9 INJECTION, SOLUTION INTRAVENOUS at 02:02

## 2018-02-03 RX ADMIN — BENZOCAINE AND MENTHOL 1 LOZENGE: 5; 1 LIQUID ORAL at 08:18

## 2018-02-03 RX ADMIN — OXYCODONE HYDROCHLORIDE 10 MILLIGRAM(S): 5 TABLET ORAL at 02:02

## 2018-02-03 RX ADMIN — Medication 600 MILLIGRAM(S): at 05:40

## 2018-02-03 NOTE — PROGRESS NOTE ADULT - SUBJECTIVE AND OBJECTIVE BOX
POD # 1    Pt seen and examined at bedside. No overnight events.  Pt without complaints. Pain moderately controlled on current regimen.   She denies SOB/CP/palpitations, fever/chills, nausea/emesis. Tolerating clear diet.  +OOB,  No flatus, oliva in place    MEDICATIONS  (STANDING):  heparin  Injectable 5000 Unit(s) SubCutaneous every 8 hours  lactated ringers. 1000 milliLiter(s) (170 mL/Hr) IV Continuous <Continuous>    MEDICATIONS  (PRN):  acetaminophen   Tablet. 975 milliGRAM(s) Oral every 6 hours PRN Mild Pain (1 - 3)  ibuprofen  Tablet 600 milliGRAM(s) Oral every 6 hours PRN moderate pain  oxyCODONE    IR 5 milliGRAM(s) Oral every 4 hours PRN Severe Pain (7 - 10)  oxyCODONE    IR 10 milliGRAM(s) Oral every 6 hours PRN break through pain        Vital Signs Last 24 Hrs  T(C): 36.3 (03 Feb 2018 05:30), Max: 36.5 (02 Feb 2018 22:15)  T(F): 97.4 (03 Feb 2018 05:30), Max: 97.7 (02 Feb 2018 22:15)  HR: 97 (03 Feb 2018 05:30) (70 - 97)  BP: 148/75 (03 Feb 2018 05:30) (118/60 - 156/85)  BP(mean): 92 (02 Feb 2018 23:00) (86 - 94)  RR: 18 (03 Feb 2018 05:30) (14 - 20)  SpO2: 95% (03 Feb 2018 05:30) (95% - 100%)    02-02 @ 07:01  -  02-03 @ 07:00  --------------------------------------------------------  IN: 1900 mL / OUT: 1900 mL / NET: 0 mL        PHYSICAL EXAM:    Gen: NAD, A+0 x 3  CV: RRR  Pulm: CTA BL  Abdomen: soft, obese, nondistended, no guarding, no rebound, no bowel sounds noted presently,  Appropriate tenderness noted   Incisional site:  Scope sites x4 all clean and dry, dressing intact  Extremities: no lower extremity edema or calf tenderness bilaterally; intermittent compression stockings in place     Labs, additional tests:             13.8   10.40 )-----------( 237      ( 02-03 @ 05:50 )             43.2                15.1   11.94 )-----------( 218      ( 02-02 @ 22:10 )             45.8                15.2   8.42  )-----------( 242      ( 01-31 @ 16:05 )             46.7       02-03    137  |  99  |  13  ----------------------------<  140<H>  4.7   |  25  |  0.90    Ca    8.5      03 Feb 2018 05:50  Phos  3.5     02-03  Mg     1.9     02-03

## 2018-02-03 NOTE — PROGRESS NOTE ADULT - ATTENDING COMMENTS
Patient seen and examined.   Doing well.   Agree with housestaff assessment and plan.   Stable to DC home.   Instructions reviewed.

## 2018-02-03 NOTE — PROGRESS NOTE ADULT - SUBJECTIVE AND OBJECTIVE BOX
PA GynOn Post Op Note    Pt seen and examined at bedside. Pt still in PACU. Pt resting comfortably.  Pt still very sleepy at this time.  Pt denies fever, chills, chest pain, SOB, nausea, vomiting, lightheadedness, dizziness.  Cabrera catheter in place.      T(F): 97.5 (02-03-18 @ 00:00), Max: 97.7 (02-02-18 @ 22:15)  HR: 76 (02-03-18 @ 00:15) (70 - 90)  BP: 125/62 (02-03-18 @ 00:15) (121/62 - 156/85)  RR: 14 (02-03-18 @ 00:15) (14 - 20)  SpO2: 97% (02-03-18 @ 00:15) (95% - 98%)  I&O's Detail    02 Feb 2018 07:01  -  03 Feb 2018 00:51  --------------------------------------------------------  IN:    lactated ringers.: 340 mL    Oral Fluid: 100 mL  Total IN: 440 mL    OUT:    Indwelling Catheter - Urethral: 400 mL  Total OUT: 400 mL    Total NET: 40 mL    Physical Exam:  Constitutional: WDWN female, NAD AxOx3  Chest: s1s2+, RRR, clear to auscultation bilaterally, no w/r/r    Abdomen: soft, obese, nondistended, no guarding, no rebound, no bowel sounds noted presently,  Appropriate tenderness noted   Incisional site:  Scope sites x4 all clean and dry, dressing intact  Extremities: no lower extremity edema or calf tenderness bilaterally; intermittent compression stockings in place     LABS:                        15.1   11.94 )-----------( 218      ( 02 Feb 2018 22:10 )             45.8     02-02    138  |  97<L>  |  15  ----------------------------<  163<H>  4.2   |  28  |  0.96    Ca    8.8      02 Feb 2018 22:40  Phos  3.7     02-02  Mg     1.9     02-02    a/p: This 67y female, s/p Robotic Assisted TLH, BSO, Sentinal LND, for known Grade 3        CV: hemodynamically stable  PUL: desat to low 90s/high 80s - continue O2 @2L NC for SAT >95%  GI: tolerating clear fluids, advance to regular in the am  : hazel in place, d/c on 2/3/2018 after morning labs are resulted  ID: afebrile, WBC stable, labs pending in am  DVT prophylaxis: Heparin, venodynes  Pain Management: controlled  d/w gyn onc team  -encourage incentive spirometry  -encourage fluid intake  -O2 as needed overnight to maintain SAT >95%  -start d/c planning for 2/3/2018    BRIDGET Salinas  #[id number] PA GynOn Post Op Note    Pt seen and examined at bedside. Pt still in PACU. Pt resting comfortably.  Pt still very sleepy at this time.  Pt denies fever, chills, chest pain, SOB, nausea, vomiting, lightheadedness, dizziness.  Oliva catheter in place.      T(F): 97.5 (02-03-18 @ 00:00), Max: 97.7 (02-02-18 @ 22:15)  HR: 76 (02-03-18 @ 00:15) (70 - 90)  BP: 125/62 (02-03-18 @ 00:15) (121/62 - 156/85)  RR: 14 (02-03-18 @ 00:15) (14 - 20)  SpO2: 97% (02-03-18 @ 00:15) (95% - 98%)  I&O's Detail    02 Feb 2018 07:01  -  03 Feb 2018 00:51  --------------------------------------------------------  IN:    lactated ringers.: 340 mL    Oral Fluid: 100 mL  Total IN: 440 mL    OUT:    Indwelling Catheter - Urethral: 400 mL  Total OUT: 400 mL    Total NET: 40 mL    Physical Exam:  Constitutional: WDWN female, NAD AxOx3  Chest: s1s2+, RRR, clear to auscultation bilaterally, no w/r/r    Abdomen: soft, obese, nondistended, no guarding, no rebound, no bowel sounds noted presently,  Appropriate tenderness noted   Incisional site:  Scope sites x4 all clean and dry, dressing intact  Extremities: no lower extremity edema or calf tenderness bilaterally; intermittent compression stockings in place     LABS:                        15.1   11.94 )-----------( 218      ( 02 Feb 2018 22:10 )             45.8     02-02    138  |  97<L>  |  15  ----------------------------<  163<H>  4.2   |  28  |  0.96    Ca    8.8      02 Feb 2018 22:40  Phos  3.7     02-02  Mg     1.9     02-02    a/p: This 67y female, s/p Robotic Assisted TLH, BSO, Sentinal LND, for known FIGO grade 3 Endometrioid Adenocarcinoma - stable     CV: hemodynamically stable  PUL: desat to low 90s/high 80s - continue O2 @2L NC for SAT >95%  GI: tolerating clear fluids, advance to regular in the am  : oliva in place, d/c on 2/3/2018 after morning labs are resulted  ID: afebrile, WBC stable, labs pending in am  DVT prophylaxis: Heparin, venodynes  Pain Management: controlled  d/w gyn onc team  -encourage incentive spirometry  -encourage fluid intake  -O2 as needed overnight to maintain SAT >95%  -start d/c planning for 2/3/2018    BRIDGET Salinas  #[id number]

## 2018-02-03 NOTE — PROGRESS NOTE ADULT - ASSESSMENT
67y POD#1 s/p Robotic Assisted TLH, BSO, Sentinal LND, for known FIGO grade 3 Endometrioid Adenocarcinoma . Patient is currently stable.    CV: hemodynamically stable  PUL: desat to low 90s/high 80s - continue O2 @2L NC for SAT >95%  GI: tolerating clear fluids, advanced to regular diet  : oliva in place, uop adequate. will remove this AM.  ID: afebrile,   DVT prophylaxis: Heparin, venodynes  Pain Management: controlled  Dispo:  -encourage incentive spirometry  -O2 as needed to maintain SAT >95%  -start d/c planning for 2/3/2018    ANHTONY Melissa, PGY2  #[id number]

## 2018-02-05 LAB — NON-GYNECOLOGICAL CYTOLOGY STUDY: SIGNIFICANT CHANGE UP

## 2018-02-11 ENCOUNTER — EMERGENCY (EMERGENCY)
Facility: HOSPITAL | Age: 68
LOS: 1 days | Discharge: HOME | End: 2018-02-11
Attending: EMERGENCY MEDICINE | Admitting: EMERGENCY MEDICINE
Payer: COMMERCIAL

## 2018-02-11 VITALS
SYSTOLIC BLOOD PRESSURE: 174 MMHG | OXYGEN SATURATION: 98 % | DIASTOLIC BLOOD PRESSURE: 84 MMHG | RESPIRATION RATE: 16 BRPM | HEART RATE: 78 BPM

## 2018-02-11 VITALS
TEMPERATURE: 98 F | HEART RATE: 78 BPM | SYSTOLIC BLOOD PRESSURE: 147 MMHG | OXYGEN SATURATION: 98 % | DIASTOLIC BLOOD PRESSURE: 70 MMHG | RESPIRATION RATE: 16 BRPM

## 2018-02-11 DIAGNOSIS — Z90.49 ACQUIRED ABSENCE OF OTHER SPECIFIED PARTS OF DIGESTIVE TRACT: Chronic | ICD-10-CM

## 2018-02-11 DIAGNOSIS — Z98.891 HISTORY OF UTERINE SCAR FROM PREVIOUS SURGERY: Chronic | ICD-10-CM

## 2018-02-11 DIAGNOSIS — Z98.49 CATARACT EXTRACTION STATUS, UNSPECIFIED EYE: Chronic | ICD-10-CM

## 2018-02-11 DIAGNOSIS — T78.40XA ALLERGY, UNSPECIFIED, INITIAL ENCOUNTER: ICD-10-CM

## 2018-02-11 DIAGNOSIS — Z96.659 PRESENCE OF UNSPECIFIED ARTIFICIAL KNEE JOINT: Chronic | ICD-10-CM

## 2018-02-11 DIAGNOSIS — N83.209 UNSPECIFIED OVARIAN CYST, UNSPECIFIED SIDE: Chronic | ICD-10-CM

## 2018-02-11 LAB — SURGICAL PATHOLOGY STUDY: SIGNIFICANT CHANGE UP

## 2018-02-11 PROCEDURE — 99053 MED SERV 10PM-8AM 24 HR FAC: CPT

## 2018-02-11 PROCEDURE — 99283 EMERGENCY DEPT VISIT LOW MDM: CPT | Mod: 25

## 2018-02-11 RX ORDER — DIPHENHYDRAMINE HCL 50 MG
25 CAPSULE ORAL ONCE
Qty: 0 | Refills: 0 | Status: COMPLETED | OUTPATIENT
Start: 2018-02-11 | End: 2018-02-11

## 2018-02-11 RX ORDER — FAMOTIDINE 10 MG/ML
20 INJECTION INTRAVENOUS ONCE
Qty: 0 | Refills: 0 | Status: COMPLETED | OUTPATIENT
Start: 2018-02-11 | End: 2018-02-11

## 2018-02-11 RX ORDER — ONDANSETRON 8 MG/1
4 TABLET, FILM COATED ORAL ONCE
Qty: 0 | Refills: 0 | Status: COMPLETED | OUTPATIENT
Start: 2018-02-11 | End: 2018-02-11

## 2018-02-11 RX ORDER — HYDROCORTISONE 1 %
1 OINTMENT (GRAM) TOPICAL
Qty: 4 | Refills: 0 | OUTPATIENT
Start: 2018-02-11 | End: 2018-02-15

## 2018-02-11 RX ADMIN — FAMOTIDINE 20 MILLIGRAM(S): 10 INJECTION INTRAVENOUS at 07:54

## 2018-02-11 RX ADMIN — ONDANSETRON 4 MILLIGRAM(S): 8 TABLET, FILM COATED ORAL at 08:44

## 2018-02-11 RX ADMIN — Medication 25 MILLIGRAM(S): at 07:54

## 2018-02-11 NOTE — CONSULT NOTE ADULT - ASSESSMENT
AP 66yo POD 9 from RA Total Laparoscopic Hysterectomy, BSP, SLND, Cysto for endometrioid adenocarcinoma FIGO grade 3 with incisional itching/pain.

## 2018-02-11 NOTE — ED PROVIDER NOTE - NS ED ROS FT
General: No fevers / + chills  HENT: No ear pain, runny nose, or sore throat  Eyes: No visual changes  CP: No chest pain, palpitations, or light headedness  Resp: No shortness of breath, no cough  GI: + abdominal pain at surgical sites, no diarrhea, no constipation, +nausea, no vomiting  : No urinary fz, dysuria, or hematuria  Neuro: No numbness, tingling, or weakness  Endo: No hx of diabetes  Heme: No hx of easy bleeding or bruising

## 2018-02-11 NOTE — ED ADULT TRIAGE NOTE - CHIEF COMPLAINT QUOTE
Pt c/o surgical scar itching sp hysterectomy on 02/02/18. Denies fevers, open wounds, dc, bleeding. Also c/o R sided abd pain with nausea.

## 2018-02-11 NOTE — CONSULT NOTE ADULT - SUBJECTIVE AND OBJECTIVE BOX
68yo post menopausal P3 here for evaluation of incisions. Patient POD 9 from RA Total Laparoscopic Hysterectomy, Bilateral Salpingo-oophorectomy, EVA, SLND, washings, cysto. Patient states post operative course uncomplicated per patient. Patient able to ambulate, + flatus, +BM. Patient states she has slight nausea, however tolerating diet. Minimal pain, patient taking tylenol and motrin as needed. Minimally taking oxycodone. Patient states that for the past 2 days, patient with severe itching and burning around incision. Patient tried warm compresses, however incision still itching.       OB/GYN HISTORY:   [place]:  [place]:  Term:  :  MAB/TAB/Ectopic:  Living:    Last Menstrual Period    Name of GYN Physician:  Date of Last Pap:  History of Abnormal Pap:  Date of Last Mammogram:  Date of Last Colonoscopy:  Current OCP use:     PAST MEDICAL & SURGICAL HISTORY:  Hypertension  Endometrial adenocarcinoma  Asthma: denies any recent hospitalizations (rarely using inhaler), denies intubations  IBS (irritable bowel syndrome): on Dexilant  Obesity: BMI above 50  Arthritis: knee s/p knee replacement right side   History of cataract surgery: right &quot;years ago&quot;  H/O total knee replacement: right   Benign ovarian cyst: removal    H/O  section:   S/P cholecystectomy:       REVIEW OF SYSTEMS      General:	    Skin/Breast:  	  Ophthalmologic:  	  ENMT:	    Respiratory and Thorax:  	  Cardiovascular:	    Gastrointestinal:	    Genitourinary:	    Musculoskeletal:	    Neurological:	    Psychiatric:	    Hematology/Lymphatics:	    Endocrine:	    Allergic/Immunologic:	    MEDICATIONS  (STANDING):    MEDICATIONS  (PRN):      Allergies    No Known Allergies    Intolerances        SOCIAL HISTORY:    FAMILY HISTORY:  Family history of cancer  Family history of bone cancer (Sibling)      Vital Signs Last 24 Hrs  T(C): 36.9 (2018 07:54), Max: 36.9 (2018 05:20)  T(F): 98.5 (2018 07:54), Max: 98.5 (2018 07:54)  HR: 78 (2018 08:20) (78 - 78)  BP: 174/84 (2018 08:20) (147/70 - 174/84)  BP(mean): --  RR: 16 (2018 08:20) (16 - 18)  SpO2: 98% (2018 08:20) (98% - 100%)    PHYSICAL EXAM:      Constitutional: alert and oriented x 3    Breasts: no tenderness, no nodules    Respiratory: clear    Cardiovascular: regular rate and rhythm    Gastrointestinal: soft, non tender, + bowel sounds. No hepatosplenomegaly, no palpable masses    Genitourinary: NEFG  Cervix: closed/ long, no CMT  Uterus: normal size, non tender  Adnexa: non tender, no palpable masses    Rectal:     Extremities:    Neurological:    Skin:    Lymph Nodes:        LABS:                RADIOLOGY & ADDITIONAL STUDIES: 66yo post menopausal P3 here for evaluation of incisions. Patient POD 9 from [facility] Total Laparoscopic Hysterectomy, Bilateral Salpingo-oophorectomy, EVA, SLND, washings, cysto. Patient states post operative course uncomplicated per patient. Patient able to ambulate, + flatus, +BM. Patient states she has slight nausea, however tolerating diet. Minimal pain, patient taking tylenol and motrin as needed. Minimally taking oxycodone. Patient states that for the past 2 days, patient with severe itching and burning around incision. Patient tried warm compresses, however incision still itching.     Menstrual Hx: The patient is postmenopausal. age at menarche was 12. age at menopause was 57.   Prior pregnancies: G3, P3 , Full Term: 3 and Living: 3      PAST MEDICAL & SURGICAL HISTORY:  Hypertension  Endometrial adenocarcinoma  Asthma: denies any recent hospitalizations (rarely using inhaler), denies intubations  IBS (irritable bowel syndrome): on Dexilant  Obesity: BMI above 50  Arthritis: knee s/p knee replacement right side   History of cataract surgery: right &quot;years ago&quot;  H/O total knee replacement: right   Benign ovarian cyst: removal    H/O  section:   S/P cholecystectomy:       REVIEW OF SYSTEMS negative except for noted above.   MEDICATIONS  (STANDING):    MEDICATIONS  (PRN): tylenol, motrin, oxy prn      Allergies    No Known Allergies    Intolerances        SOCIAL HISTORY:    FAMILY HISTORY:  Family history of cancer  Family history of bone cancer (Sibling)      Vital Signs Last 24 Hrs  T(C): 36.9 (2018 07:54), Max: 36.9 (2018 05:20)  T(F): 98.5 (2018 07:54), Max: 98.5 (2018 07:54)  HR: 78 (2018 08:20) (78 - 78)  BP: 174/84 (2018 08:20) (147/70 - 174/84)  BP(mean): --  RR: 16 (2018 08:20) (16 - 18)  SpO2: 98% (2018 08:20) (98% - 100%)    PHYSICAL EXAM:  Constitutional: alert and oriented x 3  Respiratory: clear  Cardiovascular: regular rate and rhythm  Gastrointestinal: soft, non tender, + bowel sounds. Robotic trocar sites all evaluated, noted to be erythematous around incision, circumferentially to steri- strips. Patient states itching is directly around steri strips. no skin breakdown noted. no papules noted.   Genitourinary: deferred. no VB noted.   Extremities: no erythema, no edema.           LABS:      RADIOLOGY & ADDITIONAL STUDIES: 66yo post menopausal P3 here for evaluation of incisions. Patient POD 9 from RA Total Laparoscopic Hysterectomy, Bilateral Salpingo-oophorectomy, EVA, SLND, washings, cysto. Patient states post operative course uncomplicated per patient. Patient able to ambulate, + flatus, +BM. Patient states she has slight nausea, however tolerating diet. Minimal pain, patient taking tylenol and motrin as needed. Minimally taking oxycodone. Patient states that for the past 2 days, patient with severe itching and burning around incision. Patient tried warm compresses, however incision still itching.     Patient given benadryl in ED with improvement of symptoms    Menstrual Hx: The patient is postmenopausal. age at menarche was 12. age at menopause was 57.   Prior pregnancies: G3, P3 , Full Term: 3 and Living: 3      PAST MEDICAL & SURGICAL HISTORY:  Hypertension  Endometrial adenocarcinoma  Asthma: denies any recent hospitalizations (rarely using inhaler), denies intubations  IBS (irritable bowel syndrome): on Dexilant  Obesity: BMI above 50  Arthritis: knee s/p knee replacement right side   History of cataract surgery: right &quot;years ago&quot;  H/O total knee replacement: right   Benign ovarian cyst: removal    H/O  section:   S/P cholecystectomy:       REVIEW OF SYSTEMS negative except for noted above.   MEDICATIONS  (STANDING):    MEDICATIONS  (PRN): tylenol, motrin, oxy prn      Allergies    No Known Allergies    Intolerances        SOCIAL HISTORY:    FAMILY HISTORY:  Family history of cancer  Family history of bone cancer (Sibling)      Vital Signs Last 24 Hrs  T(C): 36.9 (2018 07:54), Max: 36.9 (2018 05:20)  T(F): 98.5 (2018 07:54), Max: 98.5 (2018 07:54)  HR: 78 (2018 08:20) (78 - 78)  BP: 174/84 (2018 08:20) (147/70 - 174/84)  BP(mean): --  RR: 16 (2018 08:20) (16 - 18)  SpO2: 98% (2018 08:20) (98% - 100%)    PHYSICAL EXAM:  Constitutional: alert and oriented x 3  Respiratory: clear  Cardiovascular: regular rate and rhythm  Gastrointestinal: soft, non tender, + bowel sounds. Robotic trocar sites all evaluated, noted to be erythematous around incision, circumferentially to steri- strips. Patient states itching is directly around steri strips. no skin breakdown noted. no papules noted.   Genitourinary: deferred. no VB noted.   Extremities: no erythema, no edema.           LABS:      RADIOLOGY & ADDITIONAL STUDIES: 68yo post menopausal P3 here for evaluation of incisions. Patient POD 9 from RA Total Laparoscopic Hysterectomy, Bilateral Salpingo-oophorectomy, EVA, SLND, washings, cysto. Patient states post operative course uncomplicated per patient. Patient able to ambulate, + flatus, +BM. Patient states she has slight nausea, however tolerating diet. Minimal pain, patient taking tylenol and motrin as needed. Minimally taking oxycodone. Patient states that for the past 2 days, patient with severe itching and burning around incision. Patient tried warm compresses, however incision still itching.     Patient given benadryl in ED with improvement of symptoms    Menstrual Hx: The patient is postmenopausal. age at menarche was 12. age at menopause was 57.   Prior pregnancies: G3, P3 , Full Term: 3 and Living: 3      PAST MEDICAL & SURGICAL HISTORY:  Hypertension  Endometrial adenocarcinoma  Asthma: denies any recent hospitalizations (rarely using inhaler), denies intubations  IBS (irritable bowel syndrome): on Dexilant  Obesity: BMI above 50  Arthritis: knee s/p knee replacement right side   History of cataract surgery: right &quot;years ago&quot;  H/O total knee replacement: right   Benign ovarian cyst: removal    H/O  section:   S/P cholecystectomy:       REVIEW OF SYSTEMS negative except for noted above.   MEDICATIONS  (STANDING):    MEDICATIONS  (PRN): tylenol, motrin, oxy prn      Allergies    No Known Allergies    Intolerances        SOCIAL HISTORY:    FAMILY HISTORY:  Family history of cancer  Family history of bone cancer (Sibling)      Vital Signs Last 24 Hrs  T(C): 36.9 (2018 07:54), Max: 36.9 (2018 05:20)  T(F): 98.5 (2018 07:54), Max: 98.5 (2018 07:54)  HR: 78 (2018 08:20) (78 - 78)  BP: 174/84 (2018 08:20) (147/70 - 174/84)  BP(mean): --  RR: 16 (2018 08:20) (16 - 18)  SpO2: 98% (2018 08:20) (98% - 100%)    PHYSICAL EXAM:  Constitutional: alert and oriented x 3  Respiratory: clear  Cardiovascular: regular rate and rhythm  Gastrointestinal: soft, non tender, + bowel sounds. Robotic trocar sites all evaluated, noted to be erythematous around incision, circumferentially to steri- strips. Patient states itching is directly around steri strips. no skin breakdown noted. no papules noted. no induration, no warmth, no crepitation, no lymphangitic streaking.  Genitourinary: deferred. no VB noted.   Extremities: no erythema, no edema.           LABS:      RADIOLOGY & ADDITIONAL STUDIES:

## 2018-02-11 NOTE — ED PROVIDER NOTE - PROGRESS NOTE DETAILS
D/w ob/gyn resident on call, will come see pt for further eval. TIFFANY Munoz, EM PGY1 Pt seen by OB, steri-strips removed, pt notes that her symptoms have nearly resolved. Will provide hydrocortisone cream, benadryl, d/c to f/u w/ OB/GYN. JOSEPH Truong PGY1

## 2018-02-11 NOTE — ED PROVIDER NOTE - OBJECTIVE STATEMENT
Total hysterectomy 9 days ago, had four ports placed, now with itching at each port site as well as a burning sensation. D/w her OB on phone, likely allergic reaction to the steristrips is what she was told. However, her OB does not work on weekends thus she came in to the ED for further evaluation as she was told it was also possible it could be infectious. +itching to the area, no hx of cellulitis infections, not currently on antibiotics, has not taken any benadryl or other antihistamines for relief. Robotic hysterectomy 9 days ago, had four ports placed, now with itching at each port site as well as a burning sensation. D/w her OB on phone, likely allergic reaction to the steristrips is what she was told. However, her OB does not work on weekends thus she came in to the ED for further evaluation as she was told it was also possible it could be infectious. +itching to the area, no hx of cellulitis infections, not currently on antibiotics, has not taken any benadryl or other antihistamines for relief.

## 2018-02-11 NOTE — ED ADULT NURSE NOTE - OBJECTIVE STATEMENT
rec'd pt aaox3 in [facility], pt. s/p hysterectomy on 02/02/18, reports "itching and burning" to the surgical sites from the procedure.  Steri strips in place in the center of pt.'s abdomen as well as R and L side.  Pt. prescribed oxycode 5mg for pain, last dose yesterday.  Redness noted, no drainage/bleeding observed.  Pt. denies fevers at home, states she measured her temp and was not febrile.  Pt. states she has been showering regularly but the steri strips to the sx site have not yet fallen off.  Vital signs stable. No labs drawn at this time as per MD.  Pt awaiting re-eval and dispo.

## 2018-02-11 NOTE — ED PROVIDER NOTE - ATTENDING CONTRIBUTION TO CARE
I was physically present for the E/M service provided. I agree with above history, physical, and plan which I have reviewed and edited where appropriate. I was physically present for the key portions of the service provided.    Afebrile. NAD. Abdomen soft, mildly distended. blanching confluent urticaria around lap incision otherwise c/d/i.

## 2018-02-11 NOTE — ED PROVIDER NOTE - MEDICAL DECISION MAKING DETAILS
67F 10 d/a w/ hysterectomy now with erythema and itching at port sites, ddx infectious vs immune rxn. On exam port sites with erythema and papules consistent with urticaria. Given that the patient has not had steristrips in the past I do suspect that this is allergic, pt without constitutional symptoms at this time other than chills which are non-specific. Will trial benadryl/pepcid as patient does have a designated  () with her, reassess, dispo likely o/p f/u with her ob/gyn in 1-3 days.

## 2018-02-11 NOTE — ED PROVIDER NOTE - FAMILY HISTORY
Family history of cancer     Sibling  Still living? No  Family history of bone cancer, Age at diagnosis: Age Unknown

## 2018-02-11 NOTE — CONSULT NOTE ADULT - PROBLEM SELECTOR RECOMMENDATION 9
-benadryl and hydrocortisone cream for symptomatic relief  - steri strips removed. Incisions c/d/i.  - Patient counseled to monitor incisions to see if drainage, worsening urticaria noted.   - No antibiotics at this time    Dw Dr hero Walker PGY 4

## 2018-02-11 NOTE — ED PROVIDER NOTE - PLAN OF CARE
Apply the hydrocortisone ointment up to twice daily for relief of itching and resolution of rash. Take benadryl 25 mg once every 6 hours as needed for relief of itching, caution as this medication may cause significant drowsiness. Follow up with your OB/GYN provider this upcoming week for repeat evaluation as discussed. Return here to the emergency department for any signs of worsening such as fevers, increasing redness around the sites, development of significant pain, or intractable vomiting.

## 2018-02-11 NOTE — ED PROVIDER NOTE - PHYSICAL EXAMINATION
Gen: NAD, non-toxic, conversational  Eyes: PERRL, EOMI   HENT: Normocephalic, atraumatic. External ears normal, no rhinorrhea, moist mucous membranes.   CV: RRR, no M/R/G  Resp: CTAB, non-labored, speaking without difficulty on room air  Abd: soft, non tender except at port sites, non rigid, no guarding or rebound tenderness  Skin: dry, wwp, three incisions along the abdominal wall anteriorly with one site to the right side of the abdomen, each port site with steristrips overlying incision sites, around each site there is erythema with concomitant papules at the borders which become confluent approaching the steristrip sites, ~2-3 cm in diameter and circumferential, no induration, no warmth, no crepitation, no lymphangitic streaking.   Neuro: AOx3, speech is fluent and appropriate  Psych: Mood okay, affect euthymic

## 2018-02-11 NOTE — ED PROVIDER NOTE - CARE PLAN
Principal Discharge DX:	Allergic reaction  Assessment and plan of treatment:	Apply the hydrocortisone ointment up to twice daily for relief of itching and resolution of rash. Take benadryl 25 mg once every 6 hours as needed for relief of itching, caution as this medication may cause significant drowsiness. Follow up with your OB/GYN provider this upcoming week for repeat evaluation as discussed. Return here to the emergency department for any signs of worsening such as fevers, increasing redness around the sites, development of significant pain, or intractable vomiting.  Secondary Diagnosis:	Urticaria

## 2018-03-05 ENCOUNTER — APPOINTMENT (OUTPATIENT)
Dept: GYNECOLOGIC ONCOLOGY | Facility: CLINIC | Age: 68
End: 2018-03-05
Payer: MEDICARE

## 2018-03-05 PROCEDURE — 99024 POSTOP FOLLOW-UP VISIT: CPT | Mod: NC

## 2018-03-26 ENCOUNTER — APPOINTMENT (OUTPATIENT)
Dept: GYNECOLOGIC ONCOLOGY | Facility: CLINIC | Age: 68
End: 2018-03-26
Payer: MEDICARE

## 2018-03-26 PROCEDURE — 99024 POSTOP FOLLOW-UP VISIT: CPT

## 2018-03-26 RX ORDER — ELECTROLYTES/DEXTROSE
SOLUTION, ORAL ORAL
Refills: 0 | Status: DISCONTINUED | COMMUNITY
End: 2018-03-26

## 2018-06-25 ENCOUNTER — APPOINTMENT (OUTPATIENT)
Dept: GYNECOLOGIC ONCOLOGY | Facility: CLINIC | Age: 68
End: 2018-06-25
Payer: COMMERCIAL

## 2018-06-25 VITALS
SYSTOLIC BLOOD PRESSURE: 125 MMHG | HEIGHT: 63 IN | BODY MASS INDEX: 51.91 KG/M2 | WEIGHT: 293 LBS | DIASTOLIC BLOOD PRESSURE: 80 MMHG

## 2018-06-25 DIAGNOSIS — E78.00 PURE HYPERCHOLESTEROLEMIA, UNSPECIFIED: ICD-10-CM

## 2018-06-25 PROCEDURE — 99213 OFFICE O/P EST LOW 20 MIN: CPT

## 2018-06-25 RX ORDER — ATORVASTATIN CALCIUM 80 MG/1
TABLET, FILM COATED ORAL
Refills: 0 | Status: DISCONTINUED | COMMUNITY
End: 2018-06-25

## 2018-07-17 PROBLEM — J45.909 UNSPECIFIED ASTHMA, UNCOMPLICATED: Chronic | Status: ACTIVE | Noted: 2018-01-31

## 2018-10-17 ENCOUNTER — APPOINTMENT (OUTPATIENT)
Dept: GYNECOLOGIC ONCOLOGY | Facility: CLINIC | Age: 68
End: 2018-10-17
Payer: COMMERCIAL

## 2018-10-17 VITALS
BODY MASS INDEX: 51.91 KG/M2 | DIASTOLIC BLOOD PRESSURE: 90 MMHG | WEIGHT: 293 LBS | HEIGHT: 63 IN | SYSTOLIC BLOOD PRESSURE: 162 MMHG

## 2018-10-17 PROBLEM — E66.9 OBESITY, UNSPECIFIED: Chronic | Status: ACTIVE | Noted: 2017-10-11

## 2018-10-17 PROBLEM — C54.1 MALIGNANT NEOPLASM OF ENDOMETRIUM: Chronic | Status: ACTIVE | Noted: 2018-01-31

## 2018-10-17 PROBLEM — K58.9 IRRITABLE BOWEL SYNDROME WITHOUT DIARRHEA: Chronic | Status: ACTIVE | Noted: 2017-10-11

## 2018-10-17 PROBLEM — I10 ESSENTIAL (PRIMARY) HYPERTENSION: Chronic | Status: ACTIVE | Noted: 2018-01-31

## 2018-10-17 PROBLEM — M19.90 UNSPECIFIED OSTEOARTHRITIS, UNSPECIFIED SITE: Chronic | Status: ACTIVE | Noted: 2017-10-11

## 2018-10-17 PROCEDURE — 99214 OFFICE O/P EST MOD 30 MIN: CPT

## 2019-01-23 ENCOUNTER — APPOINTMENT (OUTPATIENT)
Dept: GYNECOLOGIC ONCOLOGY | Facility: CLINIC | Age: 69
End: 2019-01-23
Payer: MEDICARE

## 2019-01-23 PROCEDURE — 99213 OFFICE O/P EST LOW 20 MIN: CPT

## 2019-04-29 ENCOUNTER — APPOINTMENT (OUTPATIENT)
Dept: GYNECOLOGIC ONCOLOGY | Facility: CLINIC | Age: 69
End: 2019-04-29

## 2019-06-05 ENCOUNTER — APPOINTMENT (OUTPATIENT)
Dept: GYNECOLOGIC ONCOLOGY | Facility: CLINIC | Age: 69
End: 2019-06-05
Payer: MEDICARE

## 2019-06-05 VITALS
DIASTOLIC BLOOD PRESSURE: 114 MMHG | SYSTOLIC BLOOD PRESSURE: 182 MMHG | WEIGHT: 293 LBS | HEIGHT: 63 IN | BODY MASS INDEX: 51.91 KG/M2

## 2019-06-05 PROCEDURE — 99214 OFFICE O/P EST MOD 30 MIN: CPT

## 2019-07-16 NOTE — ASU DISCHARGE PLAN (ADULT/PEDIATRIC). - FOR NEXT 12 HOURS DO NOT:
Statement Selected Regular rate & rhythm, normal S1, S2; no murmurs, gallops or rubs; no S3, S4 details…

## 2019-08-23 DIAGNOSIS — R30.0 DYSURIA: ICD-10-CM

## 2019-08-26 LAB
APPEARANCE: CLEAR
BACTERIA UR CULT: NORMAL
BACTERIA: NEGATIVE
BILIRUBIN URINE: NEGATIVE
BLOOD URINE: NEGATIVE
COLOR: NORMAL
GLUCOSE QUALITATIVE U: NEGATIVE
HYALINE CASTS: 3 /LPF
KETONES URINE: NEGATIVE
LEUKOCYTE ESTERASE URINE: NEGATIVE
MICROSCOPIC-UA: NORMAL
NITRITE URINE: NEGATIVE
PH URINE: 5.5
PROTEIN URINE: NORMAL
RED BLOOD CELLS URINE: 4 /HPF
SPECIFIC GRAVITY URINE: 1.02
SQUAMOUS EPITHELIAL CELLS: 5 /HPF
UROBILINOGEN URINE: NORMAL
WHITE BLOOD CELLS URINE: 1 /HPF

## 2019-09-16 ENCOUNTER — APPOINTMENT (OUTPATIENT)
Dept: GYNECOLOGIC ONCOLOGY | Facility: CLINIC | Age: 69
End: 2019-09-16
Payer: COMMERCIAL

## 2019-09-16 VITALS
SYSTOLIC BLOOD PRESSURE: 168 MMHG | WEIGHT: 293 LBS | HEIGHT: 63 IN | DIASTOLIC BLOOD PRESSURE: 91 MMHG | BODY MASS INDEX: 51.91 KG/M2 | HEART RATE: 73 BPM

## 2019-09-16 DIAGNOSIS — L29.2 PRURITUS VULVAE: ICD-10-CM

## 2019-09-16 DIAGNOSIS — R91.8 OTHER NONSPECIFIC ABNORMAL FINDING OF LUNG FIELD: ICD-10-CM

## 2019-09-16 PROCEDURE — 99214 OFFICE O/P EST MOD 30 MIN: CPT

## 2020-01-22 ENCOUNTER — APPOINTMENT (OUTPATIENT)
Dept: GYNECOLOGIC ONCOLOGY | Facility: CLINIC | Age: 70
End: 2020-01-22
Payer: COMMERCIAL

## 2020-01-22 VITALS
HEIGHT: 63 IN | SYSTOLIC BLOOD PRESSURE: 174 MMHG | BODY MASS INDEX: 51.91 KG/M2 | WEIGHT: 293 LBS | DIASTOLIC BLOOD PRESSURE: 84 MMHG

## 2020-01-22 DIAGNOSIS — C54.1 MALIGNANT NEOPLASM OF ENDOMETRIUM: ICD-10-CM

## 2020-01-22 PROCEDURE — 99213 OFFICE O/P EST LOW 20 MIN: CPT

## 2020-01-25 PROBLEM — C54.1 ADENOCARCINOMA OF ENDOMETRIUM: Status: ACTIVE | Noted: 2017-11-06

## 2020-08-06 NOTE — DISCHARGE NOTE ADULT - NS AS DC STROKE DX YN
Airway  Performed by: Jose Alberto Randle MD  Authorized by: Jose Alberto Randle MD     Final Airway Type:  Endotracheal airway  Final Endotracheal Airway*:  ETT  ETT Size (mm)*:  7.5  Cuff*:  Regular  Technique Used for Successful ETT Placement:  Direct laryngoscopy  Devices/Methods Used in Placement*:  Mask  Intubation Procedure*:  Preoxygenation, ETCO2, Atraumatic, Dentition Unchanged and Phaynx Clear  Insertion Site:  Oral  Blade Type*:  MAC  Blade Size*:  3  Cuff Volume (mL):  7  Measured from*:  Lips  Secured at (cm)*:  22  Placement Verified by: auscultation and capnometry    Glottic View*:  1 - full view of glottis  Attempts*:  1   Patient Identified, Procedure confirmed, Emergency equipment available and Safety protocols followed  Location:  OR  Urgency:  Elective  Difficult Airway: No    Indications for Airway Management:  Anesthesia  Mask Difficulty Assessment:  1 - vent by mask  Performed By:  Anesthesiologist  Anesthesiologist:  Jose Alberto Randle MD        
no

## 2021-01-25 NOTE — ED ADULT NURSE NOTE - HARM RISK FACTORS
Routing refill request to provider for review/approval because:  Drug not on the [facility] refill protocol     Lisa CORTEZN, RN           no

## 2021-07-23 NOTE — PATIENT PROFILE ADULT. - SUPPORT PERSON NAME

## 2022-09-13 NOTE — PATIENT PROFILE ADULT. - PMH
9 Arthritis  knee s/p knee replacement right side 2000  Asthma  denies any recent hospitalizations (rarely using inhaler), denies intubations  Endometrial adenocarcinoma    Hypertension    IBS (irritable bowel syndrome)  on Dexilant  Obesity  BMI above 50

## 2023-03-23 NOTE — ED ADULT NURSE NOTE - AS O2 DELIVERY
-  Soft diet.  -  You may take Tylenol and/or Ibuprofen for pain (over-the-counter).  -  Follow-up in the [place] office on Friday, May 5 at 11:30 AM.  Appointment is already scheduled.         GENERAL ANESTHESIA/IVSEDATION/SPINAL POST SURGERY INSTRUCTIONS    1.  Rest at home (not necessarily in bed) for 24 hours following anesthesia.  You  may feel sleepy for the next 24 hours.  Do not drive an automobile, operate heavy machinery, or make important decisions.    2.  You may start with clear liquids increasing to a light diet on the day of surgery.  You may then resume your normal diet.  Do not drink alcoholic beverages for 24 hours.  If nausea occurs, limit diet to clear liquids (soda, tea, broth, Jell-O).  If nausea continues for more than 12 hours, call your doctor.    3.  The doctor prescribed nothing for pain.  Take as directed.  If nothing was prescribed, you may take a non-prescription non-aspirin containing pain medicine such as Tylenol, Advil, etc.  If pain is not relieved, call your doctor.    4.  Call you doctor if there is excessive:                A. Bleeding or drainage                B. Fever:  101 deg F or higher                C. Swelling or redness    5. You should have a responsible adult with you for a minimum of 6 hours after arrival at home.  This is for your protection and safety.    6.  Avoid smoking for 24 hours following general anesthesia.    7.  Drink plenty of fluids.  If you are unable to urinate by 8 hours after your procedure or a feeling of pressure occurs, call your surgeon and/or go to the nearest emergency center.    8.  If ou have any questions, please call your surgeon.  IF YOU ARE EXPERIENCING A MEDICAL EMERGENCY, PLEASE GO TO YOUR NEAREST EMERGENCY DEPARTMENT.    9.  Other instructions:      You may shower in 24 hours.    Follow up with your surgeon as directed. Please call the office to schedule an appointment.      Last dose of Tylenol 650mg given at 5:45pm.   room air

## 2023-04-17 NOTE — H&P PST ADULT - MUSCULOSKELETAL COMMENTS
Per provider, will repeat POC lactic after fluids and antibiotics are complete     Susy Bowen RN  04/17/23 [id number] hx right knee with replacement

## 2024-02-01 NOTE — PATIENT PROFILE ADULT. - ABILITY TO HEAR (WITH HEARING AID OR HEARING APPLIANCE IF NORMALLY USED):
Adequate: hears normal conversation without difficulty [FreeTextEntry1] : Physical Exam  Constitutional: Patient was well-developed, well-nourished and in no acute distress.   Head: Normocephalic, atraumatic.   Neck: Supple with full range of motion.   Cardiovascular: Cardiac rhythm was regular without murmur. There were no carotid bruits. Peripheral pulses were full and symmetric.   Respiratory: Lungs were clear.   Abdomen: Soft and nontender.   NEUROLOGICAL EXAMINATION:  Mental Status: Patient was alert and oriented. Speech was fluent. There was no dysarthria. Affect was normal.  Cranial Nerves:   II:  Pupils were equal and reactive. Visual fields were full.  III, IV, VI: Eye movements were full without nystagmus.   V: Facial sensation was intact.   VII: Facial strength was normal.   VIII: Hearing was equal.   IX, X: Palatal movement was normal. Phonation was normal.   XI: Sternocleidomastoids and trapezii were normal.   XII: Tongue was midline and movements normal. There was no lingual atrophy or fasciculations.   Motor Examination: Muscle bulk, tone and strength were normal.   Sensory Examination: Pinprick,  and joint position sense were intact.   Reflexes: DTRs were 2+ throughout.   Plantar Responses: Plantar responses were flexor.   Gait/Stance: Gait and tandem were normal. Romberg was negative.

## 2024-06-03 NOTE — ED ADULT NURSE NOTE - NS ED NURSE RECORD ANOTHER HT AND WT
Bed/Stretcher in lowest position, wheels locked, appropriate side rails in place/Call bell, personal items and telephone in reach/Instruct patient to call for assistance before getting out of bed/chair/stretcher/Non-slip footwear applied when patient is off stretcher/[place] to call system/Physically safe environment - no spills, clutter or unnecessary equipment/Purposeful proactive rounding/Room/bathroom lighting operational, light cord in reach No
